# Patient Record
Sex: FEMALE | Race: WHITE | NOT HISPANIC OR LATINO | Employment: FULL TIME | URBAN - METROPOLITAN AREA
[De-identification: names, ages, dates, MRNs, and addresses within clinical notes are randomized per-mention and may not be internally consistent; named-entity substitution may affect disease eponyms.]

---

## 2021-07-12 ENCOUNTER — HISTORICAL (OUTPATIENT)
Dept: ADMINISTRATIVE | Facility: HOSPITAL | Age: 61
End: 2021-07-12

## 2022-05-03 PROCEDURE — 87088 URINE BACTERIA CULTURE: CPT | Performed by: FAMILY MEDICINE

## 2022-05-04 ENCOUNTER — LAB REQUISITION (OUTPATIENT)
Dept: LAB | Facility: HOSPITAL | Age: 62
End: 2022-05-04

## 2022-05-04 DIAGNOSIS — R82.90 UNSPECIFIED ABNORMAL FINDINGS IN URINE: ICD-10-CM

## 2022-05-07 LAB — BACTERIA UR CULT: NO GROWTH

## 2023-10-23 ENCOUNTER — OFFICE VISIT (OUTPATIENT)
Dept: FAMILY MEDICINE | Facility: CLINIC | Age: 63
End: 2023-10-23
Payer: COMMERCIAL

## 2023-10-23 VITALS
HEIGHT: 59 IN | WEIGHT: 158 LBS | BODY MASS INDEX: 31.85 KG/M2 | OXYGEN SATURATION: 97 % | HEART RATE: 57 BPM | DIASTOLIC BLOOD PRESSURE: 65 MMHG | SYSTOLIC BLOOD PRESSURE: 115 MMHG | RESPIRATION RATE: 16 BRPM | TEMPERATURE: 98 F

## 2023-10-23 DIAGNOSIS — E03.9 HYPOTHYROIDISM, UNSPECIFIED TYPE: ICD-10-CM

## 2023-10-23 DIAGNOSIS — E78.2 MIXED HYPERLIPIDEMIA: ICD-10-CM

## 2023-10-23 DIAGNOSIS — Z23 IMMUNIZATION DUE: ICD-10-CM

## 2023-10-23 DIAGNOSIS — I10 PRIMARY HYPERTENSION: Primary | ICD-10-CM

## 2023-10-23 PROBLEM — E05.90 HYPERTHYROIDISM: Status: ACTIVE | Noted: 2023-10-23

## 2023-10-23 PROCEDURE — 90686 IIV4 VACC NO PRSV 0.5 ML IM: CPT | Mod: ,,, | Performed by: FAMILY MEDICINE

## 2023-10-23 PROCEDURE — 3008F PR BODY MASS INDEX (BMI) DOCUMENTED: ICD-10-PCS | Mod: CPTII,,, | Performed by: FAMILY MEDICINE

## 2023-10-23 PROCEDURE — 1159F PR MEDICATION LIST DOCUMENTED IN MEDICAL RECORD: ICD-10-PCS | Mod: CPTII,,, | Performed by: FAMILY MEDICINE

## 2023-10-23 PROCEDURE — 3074F PR MOST RECENT SYSTOLIC BLOOD PRESSURE < 130 MM HG: ICD-10-PCS | Mod: CPTII,,, | Performed by: FAMILY MEDICINE

## 2023-10-23 PROCEDURE — 90471 FLU VACCINE (QUAD) GREATER THAN OR EQUAL TO 3YO PRESERVATIVE FREE IM: ICD-10-PCS | Mod: ,,, | Performed by: FAMILY MEDICINE

## 2023-10-23 PROCEDURE — 1159F MED LIST DOCD IN RCRD: CPT | Mod: CPTII,,, | Performed by: FAMILY MEDICINE

## 2023-10-23 PROCEDURE — 90686 FLU VACCINE (QUAD) GREATER THAN OR EQUAL TO 3YO PRESERVATIVE FREE IM: ICD-10-PCS | Mod: ,,, | Performed by: FAMILY MEDICINE

## 2023-10-23 PROCEDURE — 3074F SYST BP LT 130 MM HG: CPT | Mod: CPTII,,, | Performed by: FAMILY MEDICINE

## 2023-10-23 PROCEDURE — 99214 OFFICE O/P EST MOD 30 MIN: CPT | Mod: 25,,, | Performed by: FAMILY MEDICINE

## 2023-10-23 PROCEDURE — 90471 IMMUNIZATION ADMIN: CPT | Mod: ,,, | Performed by: FAMILY MEDICINE

## 2023-10-23 PROCEDURE — 3078F DIAST BP <80 MM HG: CPT | Mod: CPTII,,, | Performed by: FAMILY MEDICINE

## 2023-10-23 PROCEDURE — 99214 PR OFFICE/OUTPT VISIT, EST, LEVL IV, 30-39 MIN: ICD-10-PCS | Mod: 25,,, | Performed by: FAMILY MEDICINE

## 2023-10-23 PROCEDURE — 3008F BODY MASS INDEX DOCD: CPT | Mod: CPTII,,, | Performed by: FAMILY MEDICINE

## 2023-10-23 PROCEDURE — 3078F PR MOST RECENT DIASTOLIC BLOOD PRESSURE < 80 MM HG: ICD-10-PCS | Mod: CPTII,,, | Performed by: FAMILY MEDICINE

## 2023-10-23 RX ORDER — NAPROXEN SODIUM 220 MG/1
81 TABLET, FILM COATED ORAL DAILY
COMMUNITY

## 2023-10-23 RX ORDER — ESTRADIOL 1 MG/1
1 TABLET ORAL DAILY
COMMUNITY
Start: 2023-09-12

## 2023-10-23 RX ORDER — GABAPENTIN 100 MG/1
300 CAPSULE ORAL EVERY MORNING
COMMUNITY
Start: 2023-09-19

## 2023-10-23 RX ORDER — TRIAMTERENE/HYDROCHLOROTHIAZID 37.5-25 MG
1 TABLET ORAL DAILY
COMMUNITY
Start: 2023-08-16

## 2023-10-23 RX ORDER — PANTOPRAZOLE SODIUM 40 MG/1
40 TABLET, DELAYED RELEASE ORAL DAILY
COMMUNITY
Start: 2023-08-03

## 2023-10-23 RX ORDER — GABAPENTIN 300 MG/1
300 CAPSULE ORAL NIGHTLY
COMMUNITY
Start: 2023-09-07

## 2023-10-23 RX ORDER — AMLODIPINE BESYLATE 5 MG/1
5 TABLET ORAL DAILY
COMMUNITY
Start: 2023-10-16

## 2023-10-23 RX ORDER — LEVOTHYROXINE SODIUM 88 UG/1
88 TABLET ORAL DAILY
COMMUNITY
Start: 2023-07-27

## 2023-10-23 RX ORDER — ROSUVASTATIN CALCIUM 10 MG/1
10 TABLET, COATED ORAL NIGHTLY
COMMUNITY
Start: 2023-10-11

## 2023-10-23 NOTE — PROGRESS NOTES
Patient Name: Ale Richardson     Patient ID: [id number]     Chief Complaint: Results (Go over lab results.)      HPI:     Ale Richardson is a 63 y.o. female here today for Hypertension & hyperlipidemia.    Past Medical History:   Diagnosis Date    Asthma     History of endometriosis     History of IBS     Hypertension     Nasal sinus inflammation due to allergy     Osteoarthritis     RA (rheumatoid arthritis)         Past Surgical History:   Procedure Laterality Date    BREAST BIOPSY Left     ABN Cells removed.    FOOT SURGERY Left 05/2023    HYSTERECTOMY  10/2002        Social History     Socioeconomic History    Marital status:     Number of children: 4   Tobacco Use    Smoking status: Never    Smokeless tobacco: Never   Substance and Sexual Activity    Alcohol use: Not Currently    Drug use: Never    Sexual activity: Not Currently        Current Outpatient Medications   Medication Instructions    amLODIPine (NORVASC) 5 mg, Oral, Daily    aspirin 81 mg, Oral, Daily    estradioL (ESTRACE) 1 mg, Oral, Daily    gabapentin (NEURONTIN) 300 mg, Oral, Every morning    gabapentin (NEURONTIN) 300 mg, Oral, Nightly    loratadine (CLARITIN ORAL) Oral, Daily    pantoprazole (PROTONIX) 40 mg, Oral, Daily    rosuvastatin (CRESTOR) 10 mg, Oral, Nightly    SYNTHROID 88 mcg, Oral, Daily    triamterene-hydrochlorothiazide 37.5-25 mg (MAXZIDE-25) 37.5-25 mg per tablet 1 tablet, Oral, Daily    VITAMIN K2, MK-4, ORAL 1,000 Units, Oral, Daily       Review of patient's allergies indicates:   Allergen Reactions    Iodine Hives     Injectable iodine.    Morphine Other (See Comments)     Blood pressure bottomed out.        Immunization History   Administered Date(s) Administered    COVID-19, MRNA, LN-S, PF (Pfizer) (Purple Cap) 03/17/2021, 04/07/2021, 12/06/2021       Patient Care Team:  David Lovelace Sr., MD as PCP - General (Family Medicine)  Charbel Feldman MD as Consulting Physician (Internal Medicine)  Jean-Claude Joaquin  "MD MARIET[place] as Consulting Physician (Cardiology)  Yoel Farias MD as Consulting Physician (Orthopedic Surgery)  Alissa Douglas MD as Consulting Physician (Rheumatology)  Teri Grace MD as Consulting Physician (Obstetrics and Gynecology)     Subjective:     Review of Systems    10 point review of systems conducted, negative except as stated in the history of present illness. See HPI for details.    Objective:     Visit Vitals  /65 (BP Location: Right arm, Patient Position: Sitting, BP Method: Medium (Manual))   Pulse (!) 57   Temp 97.5 °F (36.4 °C)   Resp 16   Ht 4' 11" (1.499 m)   Wt 71.7 kg (158 lb)   SpO2 97%   BMI 31.91 kg/m²       Physical Exam  Constitutional:       Appearance: She is obese.   HENT:      Head: Normocephalic and atraumatic.   Cardiovascular:      Rate and Rhythm: Normal rate and regular rhythm.   Pulmonary:      Effort: Pulmonary effort is normal.      Breath sounds: Normal breath sounds.   Abdominal:      Palpations: Abdomen is soft.   Musculoskeletal:         General: Normal range of motion.      Cervical back: Normal range of motion and neck supple.   Skin:     General: Skin is warm and dry.   Neurological:      General: No focal deficit present.      Mental Status: She is alert and oriented to person, place, and time.   Psychiatric:         Mood and Affect: Mood normal.           Assessment:       ICD-10-CM ICD-9-CM   1. Primary hypertension  I10 401.9   2. Mixed hyperlipidemia  E78.2 272.2   3. Hypothyroidism, unspecified type  E03.9 244.9   4. Immunization due  Z23 V05.9        Plan:     1. Primary hypertension  Overview:  Continue present med tx: Amlodipine 5 mg one po q day & Maxzide 25  1 daily.  Low Sodium Diet (DASH Diet - Less than 2 grams of sodium per day).  Monitor blood pressure daily and log. Report consistent numbers greater than 140/90.  Maintain healthy weight with goal BMI <30. Exercise 30 minutes per day, 5 days per week.    Assessment & Plan:  B/P=115/65 "  Well  controled and at goal.  Followed by cardiologist.      2. Mixed hyperlipidemia  Overview:  Present medication Crestor 10 mg one po q day.   Stressed importance of dietary modifications. Follow a low cholesterol, low saturated fat diet with less that 200mg of cholesterol a day.  Avoid fried foods and high saturated fats (high saturated fats less than 7% of calories).  Add Flax Seed/Fish Oil supplements to diet. Increase dietary fiber.  Regular exercise can reduce LDL and raise HDL. Stressed importance of physical activity 5 times per week for 30 minutes per day.     Assessment & Plan:  Followed by cardiologist.      3. Hypothyroidism, unspecified type  Overview:    Continue Levothyroxine levothyroxine 88 mcg daily.  Take medicine on an empty stomach with water (no other medications or beverages). Wait 30 minutes to eat or drink.  Report any symptoms of thinning hair, breaking nails, fatigue, weight gain or loss, palpitations.     Assessment & Plan:  Patient's last TSH was 1.84 on 10/16/2023.  Patient's hypothyroidism is well controlled and at goal.      4. Immunization due  -     Influenza - Quadrivalent *Preferred* (6 months+) (PF)        [x] Discussed lab findings with the patient.  [x] Discussed diet, exercise and if appropriate, weight loss.  [] Instructions and information, including risks and benefits of prescribed medication(s) have been reviewed with the patient and patient verbalizes understanding. Questions have been answered to the patient's satisfaction.  [] Appropriate counseling has been given regarding anxiety and depressive issues that were discussed today.  [] Any lab drawn today will be reviewed by physician at the time it is received and appropriate recommendations bill be made and discussed with patient.     No follow-ups on file.   In addition to their scheduled follow up, the patient has also been instructed to follow up on as needed basis.     Future Appointments   Date Time Provider  Department Center   4/24/2024  7:20 AM LAB, [facility]   4/29/2024  3:30 PM David Lovelace Sr., MD [facility] Lovelace Sr, MD

## 2023-10-23 NOTE — LETTER
AUTHORIZATION FOR RELEASE OF   CONFIDENTIAL INFORMATION    Dear Dr. Grace's Office,    We are seeing Ale Richardson, date of birth 1960, in the clinic at [facility]. David Lovelace Sr., MD is the patient's PCP. Ale Richardson has an outstanding lab/procedure at the time we reviewed her chart. In order to help keep her health information updated, she has authorized us to request the following medical record(s):        (  )  MAMMOGRAM                                      (  )  COLONOSCOPY      (  )  PAP SMEAR                                          (  )  OUTSIDE LAB RESULTS     (  )  DEXA SCAN                                          (  )  EYE EXAM            (  )  FOOT EXAM                                          (  )  ENTIRE RECORD     (  )  OUTSIDE IMMUNIZATIONS                 (  )  _______________         Please fax records to [facility], Bourgeois, Leonard Jb. Sr., MD, [phone]     If you have any questions, please contact our office at [phone].           Patient Name: Ale Richardson  : 1960  Patient Phone #: [phone]

## 2023-10-23 NOTE — ASSESSMENT & PLAN NOTE
Patient's last TSH was 1.84 on 10/16/2023.  Patient's hypothyroidism is well controlled and at goal.

## 2024-06-09 PROBLEM — E67.3 HIGH VITAMIN D LEVEL: Status: ACTIVE | Noted: 2024-06-09

## 2024-06-09 PROBLEM — Z23 IMMUNIZATION DUE: Status: RESOLVED | Noted: 2023-10-23 | Resolved: 2024-06-09

## 2024-06-09 PROBLEM — Z00.00 HEALTHCARE MAINTENANCE: Status: ACTIVE | Noted: 2024-06-09

## 2024-06-09 NOTE — PROGRESS NOTES
Family Medicine    Patient ID: [id number]     Chief Complaint: Results (No concerns)      HPI:     Ale Richardson is a 64 y.o. female here today for a follow up.     Past Medical History:   Diagnosis Date    Asthma     History of endometriosis     History of IBS     Hypertension     Nasal sinus inflammation due to allergy     Osteoarthritis     RA (rheumatoid arthritis)         Past Surgical History:   Procedure Laterality Date    BREAST BIOPSY Left     ABN Cells removed.    FOOT SURGERY Left 05/2023    HYSTERECTOMY  10/2002        Social History     Tobacco Use    Smoking status: Never    Smokeless tobacco: Never   Substance and Sexual Activity    Alcohol use: Not Currently    Drug use: Never    Sexual activity: Not Currently        Current Outpatient Medications   Medication Instructions    amLODIPine (NORVASC) 5 mg, Oral, Daily    ascorbic acid (vitamin C) (VITAMIN C) 1,000 mg, Oral, Daily    aspirin 81 mg, Oral, Daily    estradioL (ESTRACE) 1 mg, Oral, Daily    gabapentin (NEURONTIN) 300 mg, Oral, Every morning    gabapentin (NEURONTIN) 300 mg, Oral, Nightly    glucosamine-chondroitin 500-400 mg tablet 1 tablet, Oral, 3 times daily    loratadine (CLARITIN ORAL) Oral, Daily    pantoprazole (PROTONIX) 40 mg, Oral, Daily    rosuvastatin (CRESTOR) 10 mg, Oral, Nightly    SYNTHROID 88 mcg, Oral, Daily    triamterene-hydrochlorothiazide 37.5-25 mg (MAXZIDE-25) 37.5-25 mg per tablet 1 tablet, Oral, Daily    VITAMIN K2, MK-4, ORAL 1,000 Units, Oral, Daily       Review of patient's allergies indicates:   Allergen Reactions    Iodine Hives     Injectable iodine.    Morphine Other (See Comments)     Blood pressure bottomed out.        Patient Care Team:  David Lovelace Sr., MD as PCP - General (Family Medicine)  Charbel Feldman MD as Consulting Physician (Internal Medicine)  Jean-Claude Joaquin MD as Consulting Physician (Cardiology)  Yoel Farias MD as Consulting Physician (Orthopedic Surgery)  Misael  "MD Alissa as Consulting Physician (Rheumatology)  Teri Grace MD as Consulting Physician (Obstetrics and Gynecology)     Subjective:     Review of Systems   Constitutional:  Negative for activity change, appetite change, fever and unexpected weight change.   HENT:  Negative for congestion, rhinorrhea and sore throat.    Eyes:  Negative for visual disturbance.   Respiratory:  Negative for cough, chest tightness and shortness of breath.    Cardiovascular:  Negative for chest pain, palpitations and leg swelling.   Gastrointestinal:  Negative for abdominal pain, blood in stool, nausea and vomiting.   Genitourinary:  Negative for difficulty urinating.   Musculoskeletal:  Negative for arthralgias.   Skin:  Negative for rash.   Neurological:  Negative for dizziness, speech difficulty, weakness, light-headedness and numbness.   Psychiatric/Behavioral:  Negative for behavioral problems, confusion, dysphoric mood, self-injury, sleep disturbance and suicidal ideas.        12 point review of systems conducted, negative except as stated in the history of present illness. See HPI for details.    Objective:     Visit Vitals  /72   Pulse 61   Temp 97.6 °F (36.4 °C)   Resp 20   Ht 4' 11" (1.499 m)   Wt 71.2 kg (157 lb)   SpO2 97%   BMI 31.71 kg/m²       Physical Exam  Constitutional:       General: She is not in acute distress.     Appearance: Normal appearance. She is not ill-appearing.   HENT:      Head: Normocephalic and atraumatic.      Right Ear: Tympanic membrane, ear canal and external ear normal. There is no impacted cerumen.      Left Ear: Tympanic membrane, ear canal and external ear normal. There is no impacted cerumen.      Nose: No congestion.      Mouth/Throat:      Pharynx: Oropharynx is clear. No oropharyngeal exudate or posterior oropharyngeal erythema.   Eyes:      General:         Right eye: No discharge.         Left eye: No discharge.      Extraocular Movements: Extraocular movements intact.      " "Conjunctiva/sclera: Conjunctivae normal.   Cardiovascular:      Rate and Rhythm: Normal rate and regular rhythm.   Pulmonary:      Effort: Pulmonary effort is normal. No respiratory distress.      Breath sounds: Normal breath sounds.   Musculoskeletal:      Right lower leg: No edema.      Left lower leg: No edema.   Skin:     Capillary Refill: Capillary refill takes less than 2 seconds.   Neurological:      Mental Status: She is alert and oriented to person, place, and time. Mental status is at baseline.   Psychiatric:         Mood and Affect: Mood normal.         Behavior: Behavior normal.         Thought Content: Thought content normal.         Judgment: Judgment normal.         Labs Reviewed:     Chemistry:  Lab Results   Component Value Date     04/25/2024    K 3.8 04/25/2024    BUN 21 04/25/2024    CREATININE 0.99 04/25/2024    EGFRNORACEVR 64 04/25/2024    CALCIUM 9.5 04/25/2024    ALBUMIN 4.6 04/25/2024    BILIDIR 0.21 10/16/2023    AST 24 04/25/2024    ALT 27 04/25/2024    JXIBEJWA05UA 61.7 04/25/2024    TSH 1.770 04/25/2024        No results found for: "HGBA1C", "MICROALBCREA"     Hematology:  Lab Results   Component Value Date    WBC 4.9 04/25/2024    HGB 14.1 04/25/2024    HCT 42.3 04/25/2024     04/25/2024       Lipid Panel:  Lab Results   Component Value Date    CHOL 177 04/25/2024    HDL 93 04/25/2024    TRIG 75 04/25/2024        Urine:  No results found for: "COLORUA", "APPEARANCEUA", "SGUA", "PHUA", "PROTEINUA", "GLUCOSEUA", "KETONESUA", "BLOODUA", "NITRITESUA", "LEUKOCYTESUR", "RBCUA", "WBCUA", "BACTERIA", "SQEPUA", "HYALINECASTS", "CREATRANDUR", "PROTEINURINE", "UPROTCREA"     Assessment:       ICD-10-CM ICD-9-CM   1. High vitamin D level  E67.3 278.4   2. Mixed hyperlipidemia  E78.2 272.2   3. Hypothyroidism, unspecified type  E03.9 244.9   4. Healthcare maintenance  Z00.00 V70.0        Plan:     1. High vitamin D level  Overview:  Reduced from 01/22  6 months ago to 61 " today.    Continue current supplementation regimen.   Repeat vitamin-D six-months      2. Mixed hyperlipidemia  Overview:  Present medication Crestor 10 mg one po q day.   Stressed importance of dietary modifications. Follow a low cholesterol, low saturated fat diet with less that 200mg of cholesterol a day.  Avoid fried foods and high saturated fats (high saturated fats less than 7% of calories).  Add Flax Seed/Fish Oil supplements to diet. Increase dietary fiber.  Regular exercise can reduce LDL and raise HDL. Stressed importance of physical activity 5 times per week for 30 minutes per day.     Assessment & Plan:   LDL-C if 70 at goal.  Continue Crestor 10 daily  Followed by cardiologist.    Orders:  -     CBC Auto Differential; Future; Expected date: 12/09/2024  -     Comprehensive Metabolic Panel; Future; Expected date: 12/09/2024  -     Lipid Panel; Future; Expected date: 12/09/2024  -     TSH; Future; Expected date: 12/09/2024  -     Hemoglobin A1C; Future; Expected date: 12/09/2024  -     Urinalysis; Future; Expected date: 12/09/2024  -     Vitamin D; Future; Expected date: 12/09/2024  -     SARS-CoV-2 Micah Ab, Semi-Quant, S; Future; Expected date: 12/10/2024    3. Hypothyroidism, unspecified type  Overview:    Continue Levothyroxine levothyroxine 88 mcg daily.  Take medicine on an empty stomach with water (no other medications or beverages). Wait 30 minutes to eat or drink.  Report any symptoms of thinning hair, breaking nails, fatigue, weight gain or loss, palpitations.     Assessment & Plan:   TSH stable  and at goaltoday.  Continue Synthroid 88 mcg daily.    Orders:  -     CBC Auto Differential; Future; Expected date: 12/09/2024  -     Comprehensive Metabolic Panel; Future; Expected date: 12/09/2024  -     Lipid Panel; Future; Expected date: 12/09/2024  -     TSH; Future; Expected date: 12/09/2024  -     Hemoglobin A1C; Future; Expected date: 12/09/2024  -     Urinalysis; Future; Expected date:  12/09/2024  -     Vitamin D; Future; Expected date: 12/09/2024  -     SARS-CoV-2 Micah Ab, Semi-Quant, S; Future; Expected date: 12/10/2024    4. Healthcare maintenance  Overview:    Breast Ca Screening (40-74):  mammo February 20, 2023, recommended 1 year follow-up.  We will order today  Breast Ca Prevention (NIH Risk Tool) (>35):  Cervical (21-65):    Osteoporosis (QxMD OST score): 1.74, low risk  Colorectal (45-75):   LDCT (50-80): N/A  Vaccine decisions:  Declines:   Accepts:       Health Maintenance Due   Topic Date Due    Hepatitis C Screening  Never done    HIV Screening  Never done    TETANUS VACCINE  Never done    Hemoglobin A1c (Diabetic Prevention Screening)  Never done    Colorectal Cancer Screening  Never done    RSV Vaccine (Age 60+ and Pregnant patients) (1 - 1-dose 60+ series) Never done    COVID-19 Vaccine (4 - 2023-24 season) 09/01/2023    Mammogram  02/17/2024         Orders:  -     SARS-CoV-2 Micah Ab, Semi-Quant, S; Future; Expected date: 12/10/2024       Patient also requested to repeat the to COVID labs she had done in 2023.      Follow up in about 6 months (around 12/10/2024) for With Labs Prior to Visit. In addition to their scheduled follow up, the patient has also been instructed to follow up on as needed basis.     Future Appointments   Date Time Provider Department Center   12/12/2024  7:40 AM LAB, [facility]   12/17/2024  8:00 AM David Lovelace Sr., MD [facility] De La Cruz MD

## 2024-06-10 ENCOUNTER — OFFICE VISIT (OUTPATIENT)
Dept: FAMILY MEDICINE | Facility: CLINIC | Age: 64
End: 2024-06-10
Payer: COMMERCIAL

## 2024-06-10 VITALS
HEIGHT: 59 IN | TEMPERATURE: 98 F | SYSTOLIC BLOOD PRESSURE: 122 MMHG | BODY MASS INDEX: 31.65 KG/M2 | OXYGEN SATURATION: 97 % | RESPIRATION RATE: 20 BRPM | DIASTOLIC BLOOD PRESSURE: 72 MMHG | HEART RATE: 61 BPM | WEIGHT: 157 LBS

## 2024-06-10 DIAGNOSIS — E78.2 MIXED HYPERLIPIDEMIA: ICD-10-CM

## 2024-06-10 DIAGNOSIS — E67.3 HIGH VITAMIN D LEVEL: Primary | ICD-10-CM

## 2024-06-10 DIAGNOSIS — Z00.00 HEALTHCARE MAINTENANCE: ICD-10-CM

## 2024-06-10 DIAGNOSIS — E03.9 HYPOTHYROIDISM, UNSPECIFIED TYPE: ICD-10-CM

## 2024-06-10 PROCEDURE — 3074F SYST BP LT 130 MM HG: CPT | Mod: CPTII,,, | Performed by: FAMILY MEDICINE

## 2024-06-10 PROCEDURE — 3078F DIAST BP <80 MM HG: CPT | Mod: CPTII,,, | Performed by: FAMILY MEDICINE

## 2024-06-10 PROCEDURE — 3008F BODY MASS INDEX DOCD: CPT | Mod: CPTII,,, | Performed by: FAMILY MEDICINE

## 2024-06-10 PROCEDURE — 1160F RVW MEDS BY RX/DR IN RCRD: CPT | Mod: CPTII,,, | Performed by: FAMILY MEDICINE

## 2024-06-10 PROCEDURE — 1159F MED LIST DOCD IN RCRD: CPT | Mod: CPTII,,, | Performed by: FAMILY MEDICINE

## 2024-06-10 PROCEDURE — 99214 OFFICE O/P EST MOD 30 MIN: CPT | Mod: ,,, | Performed by: FAMILY MEDICINE

## 2024-06-10 RX ORDER — IBUPROFEN 100 MG/5ML
1000 SUSPENSION, ORAL (FINAL DOSE FORM) ORAL DAILY
COMMUNITY

## 2024-06-10 RX ORDER — GLUCOSAMINE/CHONDRO SU A 500-400 MG
1 TABLET ORAL 3 TIMES DAILY
COMMUNITY

## 2024-09-09 PROBLEM — Z00.00 HEALTHCARE MAINTENANCE: Status: RESOLVED | Noted: 2024-06-09 | Resolved: 2024-09-09

## 2025-03-17 ENCOUNTER — RESULTS FOLLOW-UP (OUTPATIENT)
Dept: FAMILY MEDICINE | Facility: CLINIC | Age: 65
End: 2025-03-17

## 2025-03-18 LAB
SARS-COV-2 AB SERPL QL IA: POSITIVE
SARS-COV-2 SEMI-QUANT TOTAL AB: NORMAL U/ML
SARS-COV-2 SPIKE AB DILUTION: NORMAL U/ML
SARS-COV-2 SPIKE AB INTERP: POSITIVE

## 2025-03-20 ENCOUNTER — OFFICE VISIT (OUTPATIENT)
Dept: FAMILY MEDICINE | Facility: CLINIC | Age: 65
End: 2025-03-20
Payer: COMMERCIAL

## 2025-03-20 VITALS
DIASTOLIC BLOOD PRESSURE: 62 MMHG | BODY MASS INDEX: 31.32 KG/M2 | RESPIRATION RATE: 20 BRPM | HEART RATE: 67 BPM | TEMPERATURE: 98 F | OXYGEN SATURATION: 96 % | SYSTOLIC BLOOD PRESSURE: 111 MMHG | WEIGHT: 155.38 LBS | HEIGHT: 59 IN

## 2025-03-20 DIAGNOSIS — E78.2 MIXED HYPERLIPIDEMIA: ICD-10-CM

## 2025-03-20 DIAGNOSIS — Z11.4 ENCOUNTER FOR SCREENING FOR HIV: ICD-10-CM

## 2025-03-20 DIAGNOSIS — Z11.59 ENCOUNTER FOR HEPATITIS C SCREENING TEST FOR LOW RISK PATIENT: ICD-10-CM

## 2025-03-20 DIAGNOSIS — E03.9 ACQUIRED HYPOTHYROIDISM: ICD-10-CM

## 2025-03-20 DIAGNOSIS — I10 PRIMARY HYPERTENSION: Primary | ICD-10-CM

## 2025-03-20 DIAGNOSIS — E67.3 HIGH VITAMIN D LEVEL: ICD-10-CM

## 2025-03-20 PROCEDURE — 3008F BODY MASS INDEX DOCD: CPT | Mod: CPTII,,,

## 2025-03-20 PROCEDURE — 99214 OFFICE O/P EST MOD 30 MIN: CPT | Mod: ,,,

## 2025-03-20 PROCEDURE — 3078F DIAST BP <80 MM HG: CPT | Mod: CPTII,,,

## 2025-03-20 PROCEDURE — 1160F RVW MEDS BY RX/DR IN RCRD: CPT | Mod: CPTII,,,

## 2025-03-20 PROCEDURE — 1159F MED LIST DOCD IN RCRD: CPT | Mod: CPTII,,,

## 2025-03-20 PROCEDURE — 3044F HG A1C LEVEL LT 7.0%: CPT | Mod: CPTII,,,

## 2025-03-20 PROCEDURE — 3074F SYST BP LT 130 MM HG: CPT | Mod: CPTII,,,

## 2025-03-20 RX ORDER — RISEDRONATE SODIUM 35 MG/1
1 TABLET, DELAYED RELEASE ORAL
COMMUNITY
Start: 2025-01-27

## 2025-03-20 RX ORDER — ROSUVASTATIN CALCIUM 10 MG/1
10 TABLET, COATED ORAL NIGHTLY
Qty: 90 TABLET | Refills: 3 | Status: SHIPPED | OUTPATIENT
Start: 2025-03-20

## 2025-03-20 NOTE — PROGRESS NOTES
[facility]  Jessica Chanel MD    Patient ID: [id number]     Chief Complaint: Follow-up (results)      HPI:     Ale Richardson is a 65 y.o. female here today for follow up of chronic conditions.  Results discussed.  Patient has no concerns today and feels well.  LDL 62  Vitamin-D 51.5  A1c 5.3      Past Medical History:   Diagnosis Date    Asthma     History of endometriosis     History of IBS     Hypertension     Nasal sinus inflammation due to allergy     Osteoarthritis     RA (rheumatoid arthritis)         Past Surgical History:   Procedure Laterality Date    BREAST BIOPSY Left     ABN Cells removed.    FOOT SURGERY Left 05/2023    HYSTERECTOMY  10/2002        Social History     Tobacco Use    Smoking status: Never    Smokeless tobacco: Never   Substance and Sexual Activity    Alcohol use: Not Currently    Drug use: Never    Sexual activity: Not Currently        Current Outpatient Medications   Medication Instructions    amLODIPine (NORVASC) 5 mg, Daily    ascorbic acid (vitamin C) (VITAMIN C) 1,000 mg, Daily    aspirin 81 mg, Daily    estradioL (ESTRACE) 1 mg, Daily    gabapentin (NEURONTIN) 300 mg, Every morning    gabapentin (NEURONTIN) 300 mg, Nightly    glucosamine-chondroitin 500-400 mg tablet 1 tablet, 3 times daily    loratadine (CLARITIN ORAL) Daily    risedronate 35 mg TbEC 1 tablet, Every 7 days    rosuvastatin (CRESTOR) 10 mg, Oral, Nightly    SYNTHROID 88 mcg, Daily    triamterene-hydrochlorothiazide 37.5-25 mg (MAXZIDE-25) 37.5-25 mg per tablet 1 tablet, Daily    VITAMIN K2, MK-4, ORAL 1,000 Units, Daily       Review of patient's allergies indicates:   Allergen Reactions    Iodine Hives     Injectable iodine.    Morphine Other (See Comments)     Blood pressure bottomed out.        Patient Care Team:  David Lovelace Sr., MD as PCP - General (Family Medicine)  Charbel Feldman MD as Consulting Physician (Internal Medicine)  Jean-Claude Joaquin MD as Consulting Physician  "(Cardiology)  Yoel Farias MD as Consulting Physician (Orthopedic Surgery)  Alissa Douglas MD as Consulting Physician (Rheumatology)  Teri Grace MD as Consulting Physician (Obstetrics and Gynecology)     Subjective:     Review of Systems    12 point review of systems conducted, negative except as stated in the history of present illness. See HPI for details.    Objective:     Visit Vitals  /62 (BP Location: Right arm, Patient Position: Sitting)   Pulse 67   Temp 97.8 °F (36.6 °C)   Resp 20   Ht 4' 11" (1.499 m)   Wt 70.5 kg (155 lb 6.4 oz)   SpO2 96%   BMI 31.39 kg/m²       Physical Exam  Vitals and nursing note reviewed.   Constitutional:       General: She is not in acute distress.     Appearance: She is not ill-appearing.   HENT:      Head: Normocephalic and atraumatic.   Eyes:      General: No scleral icterus.     Extraocular Movements: Extraocular movements intact.      Conjunctiva/sclera: Conjunctivae normal.   Cardiovascular:      Rate and Rhythm: Normal rate and regular rhythm.      Heart sounds: No murmur heard.     No friction rub. No gallop.   Pulmonary:      Effort: Pulmonary effort is normal. No respiratory distress.      Breath sounds: Normal breath sounds. No wheezing, rhonchi or rales.   Musculoskeletal:         General: Normal range of motion.      Right lower leg: No edema.      Left lower leg: No edema.   Skin:     General: Skin is warm and dry.   Neurological:      General: No focal deficit present.      Mental Status: She is alert.   Psychiatric:         Mood and Affect: Mood normal.         Labs Reviewed:     Chemistry:  Lab Results   Component Value Date     03/13/2025    K 4.2 03/13/2025    BUN 15 03/13/2025    CREATININE 0.81 03/13/2025    EGFRNORACEVR 81 03/13/2025    CALCIUM 9.7 03/13/2025    ALBUMIN 4.6 03/13/2025    BILIDIR 0.21 10/16/2023    AST 20 03/13/2025    ALT 17 03/13/2025    [id number] 51.5 03/13/2025    TSH 1.960 03/13/2025        Lab Results "   Component Value Date    HGBA1C 5.3 03/13/2025        Hematology:  Lab Results   Component Value Date    WBC 6.0 03/13/2025    HGB 14.2 03/13/2025    HCT 44.1 03/13/2025     03/13/2025       Lipid Panel:  Lab Results   Component Value Date    CHOL 168 03/13/2025    HDL 82 03/13/2025    TRIG 142 03/13/2025        Urine:  Lab Results   Component Value Date    PROTEINUA Negative 03/13/2025    LEUKOCYTESUR Negative 03/13/2025        Assessment:       ICD-10-CM ICD-9-CM   1. Primary hypertension  I10 401.9   2. Mixed hyperlipidemia  E78.2 272.2   3. High vitamin D level  E67.3 278.4   4. Encounter for hepatitis C screening test for low risk patient  Z11.59 V73.89   5. Encounter for screening for HIV  Z11.4 V73.89   6. Acquired hypothyroidism  E03.9 244.9        Plan:     1. Primary hypertension  Overview:  Continue present med tx: Amlodipine 5 mg one po q day & Maxzide 37.5- 25  1 daily.  Low Sodium Diet (DASH Diet - Less than 2 grams of sodium per day).  Monitor blood pressure daily and log. Report consistent numbers greater than 140/90.  Maintain healthy weight with goal BMI <30. Exercise 30 minutes per day, 5 days per week.    Assessment & Plan:  Well-controlled.  Continue above regimen    Orders:  -     CBC Auto Differential; Future; Expected date: 09/20/2025  -     Comprehensive Metabolic Panel; Future; Expected date: 09/20/2025    2. Mixed hyperlipidemia  Overview:  Present medication Crestor 10 mg one po q day.   Stressed importance of dietary modifications. Follow a low cholesterol, low saturated fat diet with less that 200mg of cholesterol a day.  Avoid fried foods and high saturated fats (high saturated fats less than 7% of calories).  Add Flax Seed/Fish Oil supplements to diet. Increase dietary fiber.  Regular exercise can reduce LDL and raise HDL. Stressed importance of physical activity 5 times per week for 30 minutes per day.     Assessment & Plan:  LDL 62, at goal.  Continue above  regimen    Orders:  -     Lipid Panel; Future; Expected date: 09/20/2025  -     rosuvastatin (CRESTOR) 10 MG tablet; Take 1 tablet (10 mg total) by mouth every evening.  Dispense: 90 tablet; Refill: 3    3. High vitamin D level  Overview:  At goal.    Continue current supplementation regimen.   Repeat vitamin-D six-months      4. Encounter for hepatitis C screening test for low risk patient  -     Hepatitis C Antibody; Future; Expected date: 09/20/2025    5. Encounter for screening for HIV  -     HIV 1/2 Ag/Ab (4th Gen); Future; Expected date: 09/20/2025    6. Acquired hypothyroidism  Overview:    Continue Levothyroxine levothyroxine 88 mcg daily.  Take medicine on an empty stomach with water (no other medications or beverages). Wait 30 minutes to eat or drink.  Report any symptoms of thinning hair, breaking nails, fatigue, weight gain or loss, palpitations.     Assessment & Plan:  TSH at goal.  Continue current regimen.    Orders:  -     TSH; Future; Expected date: 09/20/2025         Follow up in about 6 months (around 9/20/2025). In addition to their scheduled follow up, the patient has also been instructed to follow up on as needed basis.     Future Appointments   Date Time Provider Department Center   9/18/2025 10:20 AM LAB, [facility]   9/25/2025  3:20 PM PROVIDER, [facility] Chanel MD